# Patient Record
Sex: MALE | Race: WHITE | Employment: FULL TIME | URBAN - METROPOLITAN AREA
[De-identification: names, ages, dates, MRNs, and addresses within clinical notes are randomized per-mention and may not be internally consistent; named-entity substitution may affect disease eponyms.]

---

## 2023-07-11 ENCOUNTER — HOSPITAL ENCOUNTER (EMERGENCY)
Facility: HOSPITAL | Age: 48
Discharge: HOME | End: 2023-07-11
Attending: EMERGENCY MEDICINE
Payer: COMMERCIAL

## 2023-07-11 VITALS
BODY MASS INDEX: 22.03 KG/M2 | TEMPERATURE: 98 F | SYSTOLIC BLOOD PRESSURE: 136 MMHG | HEART RATE: 77 BPM | HEIGHT: 73.23 IN | RESPIRATION RATE: 18 BRPM | WEIGHT: 168 LBS | DIASTOLIC BLOOD PRESSURE: 84 MMHG | OXYGEN SATURATION: 100 %

## 2023-07-11 DIAGNOSIS — S81.812A LEG LACERATION, LEFT, INITIAL ENCOUNTER: Primary | ICD-10-CM

## 2023-07-11 PROCEDURE — 99283 EMERGENCY DEPT VISIT LOW MDM: CPT

## 2023-07-11 PROCEDURE — 90471 IMMUNIZATION ADMIN: CPT

## 2023-07-11 PROCEDURE — 12001 RPR S/N/AX/GEN/TRNK 2.5CM/<: CPT

## 2023-07-11 RX ORDER — LIDOCAINE HYDROCHLORIDE 10 MG/ML
20 INJECTION, SOLUTION EPIDURAL; INFILTRATION; INTRACAUDAL; PERINEURAL ONCE
Status: COMPLETED | OUTPATIENT
Start: 2023-07-11 | End: 2023-07-11

## 2023-07-11 RX ORDER — LIDOCAINE HYDROCHLORIDE 10 MG/ML
INJECTION, SOLUTION EPIDURAL; INFILTRATION; INTRACAUDAL; PERINEURAL
Status: COMPLETED
Start: 2023-07-11 | End: 2023-07-11

## 2023-07-12 NOTE — ED INITIAL ASSESSMENT (HPI)
Vice fell on patients left shin area; bleeding controlled. Wrapped currently in triage; cms intact; pulses intact. Pt ambulated with steady gait.  Tet shot unsure;

## 2023-07-12 NOTE — DISCHARGE INSTRUCTIONS
Keep clean. Follow-up with your primary physician for suture removal in 10 to 14 days. Return to the emergency department if redness, increasing pain, discharge, or other new symptoms develop.

## 2025-06-10 ENCOUNTER — ANESTHESIA EVENT (OUTPATIENT)
Dept: SURGERY | Facility: HOSPITAL | Age: 50
End: 2025-06-10
Payer: COMMERCIAL

## 2025-06-10 ENCOUNTER — HOSPITAL ENCOUNTER (OUTPATIENT)
Facility: HOSPITAL | Age: 50
Setting detail: OBSERVATION
Discharge: HOME | End: 2025-06-11
Attending: EMERGENCY MEDICINE | Admitting: HOSPITALIST
Payer: COMMERCIAL

## 2025-06-10 ENCOUNTER — APPOINTMENT (OUTPATIENT)
Dept: ULTRASOUND IMAGING | Facility: HOSPITAL | Age: 50
End: 2025-06-10
Attending: EMERGENCY MEDICINE
Payer: COMMERCIAL

## 2025-06-10 ENCOUNTER — ANESTHESIA (OUTPATIENT)
Dept: SURGERY | Facility: HOSPITAL | Age: 50
End: 2025-06-10
Payer: COMMERCIAL

## 2025-06-10 DIAGNOSIS — K81.0 ACUTE CHOLECYSTITIS: Primary | ICD-10-CM

## 2025-06-10 DIAGNOSIS — K81.9 CHOLECYSTITIS: ICD-10-CM

## 2025-06-10 LAB
ALBUMIN SERPL-MCNC: 4.8 G/DL (ref 3.2–4.8)
ALP LIVER SERPL-CCNC: 66 U/L (ref 45–117)
ALT SERPL-CCNC: 29 U/L (ref 10–49)
ANION GAP SERPL CALC-SCNC: 10 MMOL/L (ref 0–18)
AST SERPL-CCNC: 23 U/L (ref ?–34)
BASOPHILS # BLD AUTO: 0.04 X10(3) UL (ref 0–0.2)
BASOPHILS NFR BLD AUTO: 0.4 %
BILIRUB DIRECT SERPL-MCNC: 0.4 MG/DL (ref ?–0.3)
BILIRUB SERPL-MCNC: 1.5 MG/DL (ref 0.3–1.2)
BUN BLD-MCNC: 12 MG/DL (ref 9–23)
BUN/CREAT SERPL: 10.8 (ref 10–20)
CALCIUM BLD-MCNC: 10 MG/DL (ref 8.7–10.4)
CHLORIDE SERPL-SCNC: 103 MMOL/L (ref 98–112)
CO2 SERPL-SCNC: 27 MMOL/L (ref 21–32)
CREAT BLD-MCNC: 1.11 MG/DL (ref 0.7–1.3)
DEPRECATED RDW RBC AUTO: 41.1 FL (ref 35.1–46.3)
EGFRCR SERPLBLD CKD-EPI 2021: 80 ML/MIN/1.73M2 (ref 60–?)
EOSINOPHIL # BLD AUTO: 0.03 X10(3) UL (ref 0–0.7)
EOSINOPHIL NFR BLD AUTO: 0.3 %
ERYTHROCYTE [DISTWIDTH] IN BLOOD BY AUTOMATED COUNT: 12.6 % (ref 11–15)
GLUCOSE BLD-MCNC: 122 MG/DL (ref 70–99)
HCT VFR BLD AUTO: 41.4 % (ref 39–53)
HGB BLD-MCNC: 14.1 G/DL (ref 13–17.5)
IMM GRANULOCYTES # BLD AUTO: 0.03 X10(3) UL (ref 0–1)
IMM GRANULOCYTES NFR BLD: 0.3 %
LIPASE SERPL-CCNC: 33 U/L (ref 12–53)
LYMPHOCYTES # BLD AUTO: 1.28 X10(3) UL (ref 1–4)
LYMPHOCYTES NFR BLD AUTO: 11.5 %
MCH RBC QN AUTO: 30.3 PG (ref 26–34)
MCHC RBC AUTO-ENTMCNC: 34.1 G/DL (ref 31–37)
MCV RBC AUTO: 89 FL (ref 80–100)
MONOCYTES # BLD AUTO: 1.23 X10(3) UL (ref 0.1–1)
MONOCYTES NFR BLD AUTO: 11.1 %
NEUTROPHILS # BLD AUTO: 8.51 X10 (3) UL (ref 1.5–7.7)
NEUTROPHILS # BLD AUTO: 8.51 X10(3) UL (ref 1.5–7.7)
NEUTROPHILS NFR BLD AUTO: 76.4 %
OSMOLALITY SERPL CALC.SUM OF ELEC: 291 MOSM/KG (ref 275–295)
PLATELET # BLD AUTO: 191 10(3)UL (ref 150–450)
POTASSIUM SERPL-SCNC: 4.8 MMOL/L (ref 3.5–5.1)
PROT SERPL-MCNC: 7.3 G/DL (ref 5.7–8.2)
RBC # BLD AUTO: 4.65 X10(6)UL (ref 4.3–5.7)
SODIUM SERPL-SCNC: 140 MMOL/L (ref 136–145)
WBC # BLD AUTO: 11.1 X10(3) UL (ref 4–11)

## 2025-06-10 PROCEDURE — 99232 SBSQ HOSP IP/OBS MODERATE 35: CPT | Performed by: STUDENT IN AN ORGANIZED HEALTH CARE EDUCATION/TRAINING PROGRAM

## 2025-06-10 PROCEDURE — 47562 LAPAROSCOPIC CHOLECYSTECTOMY: CPT | Performed by: STUDENT IN AN ORGANIZED HEALTH CARE EDUCATION/TRAINING PROGRAM

## 2025-06-10 PROCEDURE — 99222 1ST HOSP IP/OBS MODERATE 55: CPT | Performed by: HOSPITALIST

## 2025-06-10 PROCEDURE — 76705 ECHO EXAM OF ABDOMEN: CPT | Performed by: EMERGENCY MEDICINE

## 2025-06-10 RX ORDER — BUPIVACAINE HYDROCHLORIDE 5 MG/ML
INJECTION, SOLUTION EPIDURAL; INTRACAUDAL; PERINEURAL AS NEEDED
Status: DISCONTINUED | OUTPATIENT
Start: 2025-06-10 | End: 2025-06-10 | Stop reason: HOSPADM

## 2025-06-10 RX ORDER — ACETAMINOPHEN 500 MG
1000 TABLET ORAL EVERY 8 HOURS
Status: DISCONTINUED | OUTPATIENT
Start: 2025-06-10 | End: 2025-06-11

## 2025-06-10 RX ORDER — ONDANSETRON 2 MG/ML
INJECTION INTRAMUSCULAR; INTRAVENOUS AS NEEDED
Status: DISCONTINUED | OUTPATIENT
Start: 2025-06-10 | End: 2025-06-10 | Stop reason: SURG

## 2025-06-10 RX ORDER — SODIUM CHLORIDE 9 MG/ML
INJECTION, SOLUTION INTRAVENOUS CONTINUOUS
Status: DISCONTINUED | OUTPATIENT
Start: 2025-06-10 | End: 2025-06-11

## 2025-06-10 RX ORDER — SODIUM CHLORIDE, SODIUM LACTATE, POTASSIUM CHLORIDE, CALCIUM CHLORIDE 600; 310; 30; 20 MG/100ML; MG/100ML; MG/100ML; MG/100ML
INJECTION, SOLUTION INTRAVENOUS CONTINUOUS
Status: DISCONTINUED | OUTPATIENT
Start: 2025-06-10 | End: 2025-06-10 | Stop reason: HOSPADM

## 2025-06-10 RX ORDER — LIDOCAINE HYDROCHLORIDE 10 MG/ML
INJECTION, SOLUTION EPIDURAL; INFILTRATION; INTRACAUDAL; PERINEURAL AS NEEDED
Status: DISCONTINUED | OUTPATIENT
Start: 2025-06-10 | End: 2025-06-10 | Stop reason: SURG

## 2025-06-10 RX ORDER — OXYCODONE HYDROCHLORIDE 5 MG/1
5 TABLET ORAL EVERY 4 HOURS PRN
Status: DISCONTINUED | OUTPATIENT
Start: 2025-06-10 | End: 2025-06-11

## 2025-06-10 RX ORDER — HYDROMORPHONE HYDROCHLORIDE 1 MG/ML
0.5 INJECTION, SOLUTION INTRAMUSCULAR; INTRAVENOUS; SUBCUTANEOUS EVERY 4 HOURS PRN
Status: DISCONTINUED | OUTPATIENT
Start: 2025-06-10 | End: 2025-06-10

## 2025-06-10 RX ORDER — HYDROMORPHONE HYDROCHLORIDE 1 MG/ML
0.2 INJECTION, SOLUTION INTRAMUSCULAR; INTRAVENOUS; SUBCUTANEOUS EVERY 5 MIN PRN
Status: DISCONTINUED | OUTPATIENT
Start: 2025-06-10 | End: 2025-06-10 | Stop reason: HOSPADM

## 2025-06-10 RX ORDER — DEXAMETHASONE SODIUM PHOSPHATE 4 MG/ML
VIAL (ML) INJECTION AS NEEDED
Status: DISCONTINUED | OUTPATIENT
Start: 2025-06-10 | End: 2025-06-10 | Stop reason: SURG

## 2025-06-10 RX ORDER — ONDANSETRON 2 MG/ML
4 INJECTION INTRAMUSCULAR; INTRAVENOUS EVERY 6 HOURS PRN
Status: DISCONTINUED | OUTPATIENT
Start: 2025-06-10 | End: 2025-06-10 | Stop reason: HOSPADM

## 2025-06-10 RX ORDER — ONDANSETRON 2 MG/ML
4 INJECTION INTRAMUSCULAR; INTRAVENOUS EVERY 6 HOURS PRN
Status: DISCONTINUED | OUTPATIENT
Start: 2025-06-10 | End: 2025-06-11

## 2025-06-10 RX ORDER — ROCURONIUM BROMIDE 10 MG/ML
INJECTION, SOLUTION INTRAVENOUS AS NEEDED
Status: DISCONTINUED | OUTPATIENT
Start: 2025-06-10 | End: 2025-06-10 | Stop reason: SURG

## 2025-06-10 RX ORDER — METRONIDAZOLE 500 MG/100ML
500 INJECTION, SOLUTION INTRAVENOUS EVERY 12 HOURS
Status: DISCONTINUED | OUTPATIENT
Start: 2025-06-11 | End: 2025-06-10

## 2025-06-10 RX ORDER — GLYCOPYRROLATE 0.2 MG/ML
INJECTION, SOLUTION INTRAMUSCULAR; INTRAVENOUS AS NEEDED
Status: DISCONTINUED | OUTPATIENT
Start: 2025-06-10 | End: 2025-06-10 | Stop reason: SURG

## 2025-06-10 RX ORDER — ESMOLOL HYDROCHLORIDE 10 MG/ML
INJECTION INTRAVENOUS AS NEEDED
Status: DISCONTINUED | OUTPATIENT
Start: 2025-06-10 | End: 2025-06-10 | Stop reason: SURG

## 2025-06-10 RX ORDER — KETOROLAC TROMETHAMINE 15 MG/ML
15 INJECTION, SOLUTION INTRAMUSCULAR; INTRAVENOUS ONCE
Status: COMPLETED | OUTPATIENT
Start: 2025-06-10 | End: 2025-06-10

## 2025-06-10 RX ORDER — MORPHINE SULFATE 4 MG/ML
4 INJECTION, SOLUTION INTRAMUSCULAR; INTRAVENOUS EVERY 2 HOUR PRN
Status: DISCONTINUED | OUTPATIENT
Start: 2025-06-10 | End: 2025-06-11

## 2025-06-10 RX ORDER — HYDROMORPHONE HYDROCHLORIDE 1 MG/ML
0.6 INJECTION, SOLUTION INTRAMUSCULAR; INTRAVENOUS; SUBCUTANEOUS EVERY 5 MIN PRN
Status: DISCONTINUED | OUTPATIENT
Start: 2025-06-10 | End: 2025-06-10 | Stop reason: HOSPADM

## 2025-06-10 RX ORDER — MORPHINE SULFATE 2 MG/ML
1 INJECTION, SOLUTION INTRAMUSCULAR; INTRAVENOUS EVERY 2 HOUR PRN
Status: DISCONTINUED | OUTPATIENT
Start: 2025-06-10 | End: 2025-06-11

## 2025-06-10 RX ORDER — METRONIDAZOLE 500 MG/100ML
500 INJECTION, SOLUTION INTRAVENOUS ONCE
Status: COMPLETED | OUTPATIENT
Start: 2025-06-10 | End: 2025-06-10

## 2025-06-10 RX ORDER — PROCHLORPERAZINE EDISYLATE 5 MG/ML
5 INJECTION INTRAMUSCULAR; INTRAVENOUS EVERY 8 HOURS PRN
Status: DISCONTINUED | OUTPATIENT
Start: 2025-06-10 | End: 2025-06-11

## 2025-06-10 RX ORDER — SODIUM CHLORIDE, SODIUM LACTATE, POTASSIUM CHLORIDE, CALCIUM CHLORIDE 600; 310; 30; 20 MG/100ML; MG/100ML; MG/100ML; MG/100ML
INJECTION, SOLUTION INTRAVENOUS CONTINUOUS PRN
Status: DISCONTINUED | OUTPATIENT
Start: 2025-06-10 | End: 2025-06-10 | Stop reason: SURG

## 2025-06-10 RX ORDER — TEMAZEPAM 15 MG/1
15 CAPSULE ORAL NIGHTLY PRN
Status: DISCONTINUED | OUTPATIENT
Start: 2025-06-10 | End: 2025-06-11

## 2025-06-10 RX ORDER — SODIUM CHLORIDE 9 MG/ML
125 INJECTION, SOLUTION INTRAVENOUS CONTINUOUS
Status: DISCONTINUED | OUTPATIENT
Start: 2025-06-10 | End: 2025-06-10

## 2025-06-10 RX ORDER — KETOROLAC TROMETHAMINE 30 MG/ML
INJECTION, SOLUTION INTRAMUSCULAR; INTRAVENOUS AS NEEDED
Status: DISCONTINUED | OUTPATIENT
Start: 2025-06-10 | End: 2025-06-10 | Stop reason: SURG

## 2025-06-10 RX ORDER — MORPHINE SULFATE 2 MG/ML
2 INJECTION, SOLUTION INTRAMUSCULAR; INTRAVENOUS EVERY 2 HOUR PRN
Status: DISCONTINUED | OUTPATIENT
Start: 2025-06-10 | End: 2025-06-11

## 2025-06-10 RX ORDER — ACETAMINOPHEN 500 MG
500 TABLET ORAL EVERY 4 HOURS PRN
Status: DISCONTINUED | OUTPATIENT
Start: 2025-06-10 | End: 2025-06-11

## 2025-06-10 RX ORDER — NALOXONE HYDROCHLORIDE 0.4 MG/ML
0.08 INJECTION, SOLUTION INTRAMUSCULAR; INTRAVENOUS; SUBCUTANEOUS AS NEEDED
Status: DISCONTINUED | OUTPATIENT
Start: 2025-06-10 | End: 2025-06-10 | Stop reason: HOSPADM

## 2025-06-10 RX ORDER — PROCHLORPERAZINE EDISYLATE 5 MG/ML
5 INJECTION INTRAMUSCULAR; INTRAVENOUS EVERY 8 HOURS PRN
Status: DISCONTINUED | OUTPATIENT
Start: 2025-06-10 | End: 2025-06-10 | Stop reason: HOSPADM

## 2025-06-10 RX ORDER — HYDROMORPHONE HYDROCHLORIDE 1 MG/ML
0.4 INJECTION, SOLUTION INTRAMUSCULAR; INTRAVENOUS; SUBCUTANEOUS EVERY 5 MIN PRN
Status: DISCONTINUED | OUTPATIENT
Start: 2025-06-10 | End: 2025-06-10 | Stop reason: HOSPADM

## 2025-06-10 RX ADMIN — ROCURONIUM BROMIDE 50 MG: 10 INJECTION, SOLUTION INTRAVENOUS at 16:58:00

## 2025-06-10 RX ADMIN — ESMOLOL HYDROCHLORIDE 30 MG: 10 INJECTION INTRAVENOUS at 16:58:00

## 2025-06-10 RX ADMIN — SODIUM CHLORIDE, SODIUM LACTATE, POTASSIUM CHLORIDE, CALCIUM CHLORIDE: 600; 310; 30; 20 INJECTION, SOLUTION INTRAVENOUS at 16:56:00

## 2025-06-10 RX ADMIN — SODIUM CHLORIDE, SODIUM LACTATE, POTASSIUM CHLORIDE, CALCIUM CHLORIDE: 600; 310; 30; 20 INJECTION, SOLUTION INTRAVENOUS at 18:12:00

## 2025-06-10 RX ADMIN — KETOROLAC TROMETHAMINE 30 MG: 30 INJECTION, SOLUTION INTRAMUSCULAR; INTRAVENOUS at 18:05:00

## 2025-06-10 RX ADMIN — DEXAMETHASONE SODIUM PHOSPHATE 4 MG: 4 MG/ML VIAL (ML) INJECTION at 17:05:00

## 2025-06-10 RX ADMIN — GLYCOPYRROLATE 0.2 MG: 0.2 INJECTION, SOLUTION INTRAMUSCULAR; INTRAVENOUS at 17:05:00

## 2025-06-10 RX ADMIN — ONDANSETRON 4 MG: 2 INJECTION INTRAMUSCULAR; INTRAVENOUS at 17:05:00

## 2025-06-10 RX ADMIN — LIDOCAINE HYDROCHLORIDE 50 MG: 10 INJECTION, SOLUTION EPIDURAL; INFILTRATION; INTRACAUDAL; PERINEURAL at 16:56:00

## 2025-06-10 NOTE — CONSULTS
[facility]  Report of Consultation    Sotero Buchanan Patient Status:  Emergency    3/25/1975 MRN [id number]   Location [facility] EMERGENCY DEPARTMENT Attending John Paul Santos MD   Hosp Day # 0 PCP PHYSICIAN NONSTAFF     Reason for Consultation:  Acute cholecystitis    History of Present Illness:  Sotero Buchanan is a 50 year old male who presents to [facility] on 6/10/2025 with complaints of upper abdominal pain for the past two days. Pain is constant and associated with nausea and vomiting. He has had similar, more mild pain in the past. He had a glass of water to drink earlier this afternoon, otherwise nothing to eat. Denies fever and chills. Bedside ultrasound revealed cholelithiasis with a gallstone in the gallbladder neck with wall thickening. WBC 11.1, afebrile, other labs pending.      Past medical history none    Past abdominal surgical history none    History:  Past Medical History[1]  Past Surgical History[2]  Family History[3]   reports that he has never smoked. He has never used smokeless tobacco.    Allergies:  Allergies[4]    Medications:  Prescriptions Prior to Admission[5]    Current Hospital Medications[6]    Review of Systems:  Review of Systems   Constitutional:  Negative for chills, diaphoresis and fever.   Respiratory:  Negative for shortness of breath.    Cardiovascular:  Negative for chest pain.   Gastrointestinal:  Positive for abdominal pain. Negative for nausea, vomiting, diarrhea, constipation and abdominal distention.   Neurological:  Negative for weakness.       Physical Exam:  /80   Pulse 108   Temp 98 °F (36.7 °C)   Resp 17   Wt 185 lb (83.9 kg)   SpO2 98%   BMI 24.26 kg/m²   Physical Exam  Vitals reviewed.   Constitutional:       General: He is not in acute distress.     Appearance: Normal appearance.   HENT:      Head: Normocephalic and atraumatic.      Right Ear: External ear normal.      Left Ear: External ear normal.       Nose: Nose normal.   Pulmonary:      Effort: Pulmonary effort is normal. No respiratory distress.   Abdominal:      General: There is no distension.      Palpations: Abdomen is soft.      Tenderness: There is abdominal tenderness (RUQ). There is no guarding or rebound.   Neurological:      Mental Status: He is alert and oriented to person, place, and time.   Psychiatric:         Mood and Affect: Mood normal.         Behavior: Behavior normal.         Thought Content: Thought content normal.         Judgment: Judgment normal.         Laboratory Data:  Recent Labs   Lab 06/10/25  1516   RBC 4.65   HGB 14.1   HCT 41.4   MCV 89.0   MCH 30.3   MCHC 34.1   RDW 12.6   NEPRELIM 8.51*   WBC 11.1*   .0       No results for input(s): \"GLU\", \"BUN\", \"CREATSERUM\", \"GFRAA\", \"GFRNAA\", \"CA\", \"ALB\", \"NA\", \"K\", \"CL\", \"CO2\", \"ALKPHO\", \"AST\", \"ALT\", \"BILT\", \"TP\" in the last 168 hours.      No results for input(s): \"PTP\", \"INR\", \"PTT\" in the last 168 hours.      No results found.    Medical Decision Making         Impression:  Problem List[7]    Cholecystitis with cholelithiasis    Plan:  Plan laparoscopic cholecystectomy with Dr. Reich this afternoon  Keep NPO prior to procedure  Continue IVF and IV abx  Analgesics and antiemetics as needed  Dr. Reich to see patient and provide additional recommendations as needed. Discharge home pending recovery from surgery.    Kike Zuleta PA-C  6/10/2025  3:53 PM                           [1] History reviewed. No pertinent past medical history.  [2] History reviewed. No pertinent surgical history.  [3] No family history on file.  [4] No Known Allergies  [5] (Not in a hospital admission)  [6]   Current Facility-Administered Medications:     sodium chloride 0.9% infusion, 125 mL/hr, Intravenous, Continuous    metroNIDAZOLE in sodium chloride 0.79% (Flagyl) 5 mg/mL IVPB premix 500 mg, 500 mg, Intravenous, Once    cefTRIAXone (Rocephin) 2 g in sodium chloride 0.9% 100mL IVPB-LENOARDA, 2 g, Intravenous,  Once  [7]   Patient Active Problem List  Diagnosis    Cholecystitis

## 2025-06-10 NOTE — ANESTHESIA PROCEDURE NOTES
Airway  Date/Time: 6/10/2025 5:00 PM  Reason: elective    Airway not difficult    General Information and Staff   Patient location during procedure: OR  Anesthesiologist: Peterson Amaya MD  Performed: anesthesiologist   Performed by: Peterson Amaya MD  Authorized by: Peterson Amaya MD        Indications and Patient Condition  Indications for airway management: anesthesia  Sedation level: deep      Preoxygenated: yesPatient position: sniffing  MILS not maintained throughout    Mask difficulty assessment: 1 - vent by mask    Final Airway Details    Final airway type: endotracheal airway    Successful airway: ETT  Cuffed: yes   Successful intubation technique: Video laryngoscopy  Blade size: #4  ETT size (mm): 7.5    Cormack-Lehane Classification: grade I - full view of glottis  Cuff volume (mL): 5  Measured from: lips  Number of attempts at approach: 1

## 2025-06-10 NOTE — ANESTHESIA PREPROCEDURE EVALUATION
Anesthesia PreOp Note    HPI:     Sotero Buchanan is a 50 year old male who presents for preoperative consultation requested by: Rohini Reich MD    Date of Surgery: 6/10/2025    Procedure(s):  LAPAROSCOPIC CHOLECYSTECTOMY  Indication: Cholecystitis [K81.9]    Relevant Problems   No relevant active problems       NPO:                         History Review:  Patient Active Problem List    Diagnosis Date Noted    Cholecystitis 06/10/2025    Acute cholecystitis 06/10/2025       Past Medical History[1]    Past Surgical History[2]    Prescriptions Prior to Admission[3]  Current Medications and Prescriptions Ordered in Epic[4]    Allergies[5]    Family History[6]  Social Hx on file[7]    Available pre-op labs reviewed.  Lab Results   Component Value Date    WBC 11.1 (H) 06/10/2025    RBC 4.65 06/10/2025    HGB 14.1 06/10/2025    HCT 41.4 06/10/2025    MCV 89.0 06/10/2025    MCH 30.3 06/10/2025    MCHC 34.1 06/10/2025    RDW 12.6 06/10/2025    .0 06/10/2025     Lab Results   Component Value Date     06/10/2025    K 4.8 06/10/2025     06/10/2025    CO2 27.0 06/10/2025    BUN 12 06/10/2025    CREATSERUM 1.11 06/10/2025     (H) 06/10/2025    CA 10.0 06/10/2025          Vital Signs:  Body mass index is 24.26 kg/m².   weight is 83.9 kg (185 lb). His temperature is 97.8 °F (36.6 °C). His blood pressure is 144/60 and his pulse is 89. His respiration is 16 and oxygen saturation is 97%.   Vitals:    06/10/25 1409 06/10/25 1518 06/10/25 1618 06/10/25 1633   BP: 148/81 146/80 144/78 144/60   Pulse: 110 108 101 89   Resp: 18 17 18 16   Temp: 98 °F (36.7 °C)   97.8 °F (36.6 °C)   SpO2: 98% 98% 98% 97%   Weight: 83.9 kg (185 lb)           Anesthesia Evaluation     Patient summary reviewed and Nursing notes reviewed    No history of anesthetic complications   Airway   Mallampati: II  TM distance: >3 FB  Neck ROM: full  Dental - Dentition appears grossly intact     Pulmonary - normal exam   Cardiovascular    Exercise tolerance: good    Rhythm: regular    Neuro/Psych      GI/Hepatic/Renal    (-) GERD    Endo/Other    Abdominal                  Anesthesia Plan:   ASA:  1  Plan:   General  Airway:  ETT  Plan Comments: Eliud,  N/v yesterday, feeling fine today  GA OETT  Informed Consent Plan and Risks Discussed With:  Patient      I have informed Sotero Buchanan and/or legal guardian or family member of the nature of the anesthetic plan, benefits, risks including possible dental damage if relevant, major complications, and any alternative forms of anesthetic management.   All of the patient's questions were answered to the best of my ability. The patient desires the anesthetic management as planned.  Peterson Amaya MD  6/10/2025 4:48 PM  Present on Admission:  **None**           [1] History reviewed. No pertinent past medical history.  [2] History reviewed. No pertinent surgical history.  [3]   No medications prior to admission.   [4]   Current Facility-Administered Medications Ordered in Epic   Medication Dose Route Frequency Provider Last Rate Last Admin    sodium chloride 0.9% infusion  125 mL/hr Intravenous Continuous John Paul Santos  mL/hr at 06/10/25 1536 125 mL/hr at 06/10/25 1536    metroNIDAZOLE in sodium chloride 0.79% (Flagyl) 5 mg/mL IVPB premix 500 mg  500 mg Intravenous Once John Paul Satnos  mL/hr at 06/10/25 1607 500 mg at 06/10/25 1607     No current [facility]-ordered outpatient medications on file.   [5] No Known Allergies  [6] No family history on file.  [7]   Social History  Socioeconomic History    Marital status:    Tobacco Use    Smoking status: Never    Smokeless tobacco: Never

## 2025-06-10 NOTE — DISCHARGE INSTRUCTIONS
POST-OPERATIVE INSTRUCTIONS LAPAROSCOPIC/ROBOTIC SURGERY    Thank you very much for allowing me to participate in your care, it is truly a privilege. Below please see discharge orders that will help you during your recovery. Please do not hesitate to call the office at [phone] for any questions. After hours, the same number will allow you to reach the on-call surgeon.     Call the office [phone] to schedule your 2 week post-operative appointment with Dr. Reich or her Physician Assistant (PA).    Change bandages daily or more frequently if needed.  Keep incisions clean with soap/ water daily.   Cover incision(s) as needed.  If you have skin glue this will come off on its own    May place an ice pack over your incisions on and off (15min) at a time for the next 24-48 hours.    Resume regular diet as tolerated (recommend starting with liquids)    General guidelines for activity:      Avoid strenuous activity or lifting anything heavier than 15 pounds.    It is OK to be up and walking around. Going up and down stairs is also OK.    Do what is comfortable: stop and rest when you feel tired.    It is OK to shower after 24 hours    You will have pain medicine ordered. Take as directed/needed.    Some discomfort, mild bruising, and swelling are not unusual; please call my office if you have any severe pain, bleeding, or high fever (over 101°F)    During the robotic/laparoscopic procedure that you had, gas is pumped into the abdominal cavity.  You may feel abdominal, shoulder, or rib pain for a few days due to this.    Resume home medications (see medication reconciliation sheet)       Do NOT drive for one day and while taking your narcotic pain medicine.        Watch for signs of infection:    Excessive warmth or bright redness around your incisions    Leakage of bloody or cloudy fluid from you incisions    Fever over 100.5    If you experience constipation  Increase your water intake.  Increase your activity;  walking is best.  An over the counter stool softener or mild laxative may be necessary if you still have not had a bowel movement after several days.       Please call the office at [phone] for any questions and to make your post op appointment if needed. Thank you again for allowing me to participate in your care, and get well soon!      Rohini Reich MD  [facility] - General Surgery   [address] [place] [address]  [place]  p [phone]

## 2025-06-10 NOTE — ED PROVIDER NOTES
Patient Seen in: [facility] Post Anesthesia Care Unit        History  Chief Complaint   Patient presents with    Abdominal Pain     Stated Complaint: abdominal pain    Subjective:   HPI                  Objective:     History reviewed. No pertinent past medical history.           History reviewed. No pertinent surgical history.             Social History     Socioeconomic History    Marital status:    Tobacco Use    Smoking status: Never    Smokeless tobacco: Never                                Physical Exam    ED Triage Vitals   BP 06/10/25 1409 148/81   Pulse 06/10/25 1409 110   Resp 06/10/25 1409 18   Temp 06/10/25 1409 98 °F (36.7 °C)   Temp src 06/10/25 1811 Temporal   SpO2 06/10/25 1409 98 %   O2 Device 06/10/25 1518 None (Room air)       Current Vitals:   Vital Signs  BP: 152/82  Pulse: 103  Resp: 13  Temp: 99 °F (37.2 °C)  Temp src: Temporal  MAP (mmHg): 100    Oxygen Therapy  SpO2: 97 %  O2 Device: Nasal cannula  O2 Flow Rate (L/min): 2 L/min  Pulse Oximetry Type: Intermittent            Physical Exam            ED Course  Labs Reviewed   BASIC METABOLIC PANEL (8) - Abnormal; Notable for the following components:       Result Value    Glucose 122 (*)     All other components within normal limits   HEPATIC FUNCTION PANEL (7) - Abnormal; Notable for the following components:    Bilirubin, Total 1.5 (*)     Bilirubin, Direct 0.4 (*)     All other components within normal limits   CBC WITH DIFFERENTIAL WITH PLATELET - Abnormal; Notable for the following components:    WBC 11.1 (*)     Neutrophil Absolute Prelim 8.51 (*)     Neutrophil Absolute 8.51 (*)     Monocyte Absolute 1.23 (*)     All other components within normal limits   LIPASE - Normal   SURGICAL PATHOLOGY TISSUE                            MDM     50-year-old male with a history of gallstones presents today with abdominal pain.  Patient states that he has had 4 episodes of epigastric/right upper quadrant abdominal pain in the last 2  years.  He had gallstones diagnosed incidentally on x-ray from a chiropractor.  Patient states around 2 PM yesterday he started having some epigastric and right upper quadrant pain which has continued through presentation.  Denies fevers, nausea/vomiting, diarrhea, dysuria, shortness of breath, or other associated symptoms.    On exam, slightly tachycardic with otherwise normal vitals, well-appearing and nontoxic, symptoms to palpation in the epigastrium and right upper quadrant with a positive Ortiz sign.  No CVA tenderness.  No abdominal distention.    Differential: Biliary colic versus cholecystitis    I performed and interpreted at bedside gallbladder ultrasound which showed multiple large gallstones 1 of which was in the gallbladder neck.  Gallbladder wall thickening up to 0.75 cm without pericholecystic fluid.  No CBD dilation.    Labs and radiology ultrasound were ordered.  Labs show mild total bilirubin elevation and mild leukocytosis.  Radiology ultrasound redemonstrates the above findings.    Patient ordered for analgesia and antibiotics.    General surgery was consulted and plans to take to the OR this afternoon.    Admission disposition: 6/10/2025  4:21 PM           MDM    Disposition and Plan     Clinical Impression:  1. Acute cholecystitis    2. Cholecystitis         Disposition:  Admit  6/10/2025  4:21 pm    Follow-up:  [facility]  [address] [facility] [address]  [facility] [address]  [phone]    Follow up in 2 week(s)  appt with Dr. Damir DE LA O for lap nithya 6/10 post op          Medications Prescribed:  There are no discharge medications for this patient.            Supplementary Documentation:         Hospital Problems       Present on Admission           ICD-10-CM Noted POA    * (Principal) Acute cholecystitis K81.0 6/10/2025 Unknown    Cholecystitis K81.9 6/10/2025 Unknown

## 2025-06-10 NOTE — ANESTHESIA POSTPROCEDURE EVALUATION
Patient: Sotero Buchanan    Procedure Summary       Date: 06/10/25 Room / Location: [facility] MAIN OR 09 / [facility] MAIN OR    Anesthesia Start: 1656 Anesthesia Stop:     Procedure: LAPAROSCOPIC CHOLECYSTECTOMY (Abdomen) Diagnosis:       Cholecystitis      (Cholecystitis [K81.9])    Surgeons: Rohini Reich MD Anesthesiologist: Brendan Huggins MD    Anesthesia Type: general ASA Status: 1            Anesthesia Type: No value filed.    Vitals Value Taken Time   /74 06/10/25 18:11   Temp 97.6 °F (36.4 °C) 06/10/25 18:11   Pulse 88 06/10/25 18:11   Resp 14 06/10/25 18:11   SpO2 98 % 06/10/25 18:11       EM AN Post Evaluation:   Patient Evaluated in PACU  Patient Participation: complete - patient participated  Level of Consciousness: awake  Pain Management: adequate  Airway Patency:patent  Dental exam unchanged from preop  Yes    Cardiovascular Status: acceptable  Respiratory Status: acceptable  Postoperative Hydration acceptable      Brendan Huggins MD  6/10/2025 6:11 PM

## 2025-06-10 NOTE — OPERATIVE REPORT
OPERATIVE NOTE    PATIENT NAME: Sotero Buchanan    :  3/25/1975    MRN:  [id number]  ATTENDING PHYSICIAN:  Rohini Reich MD    PROCEDURE DATE:  6/10/2025       PREOPERATIVE DIAGNOSIS: Acute calculous cholecystitis     POSTOPERATIVE DIAGNOSIS: same plus chronic cholecystitis     SURGEON: Rohini Reich MD    ASSISTANT: Kike Zuleta PA-C     OPERATION: Laparoscopic cholecystectomy    ANESTHESIA: General    ESTIMATED BLOOD LOSS:  5 ml      COMPLICATIONS: none     SPECIMENS: Was Obtained: Gallbladder    INDICATIONS: The patient is a 50 year old year old male with history of above preop diagnosis.  I explained the risk, benefits, expected outcome, and alternatives to the procedure. Patient understands the risks include but not inclusive to bleeding, infection, bile leak, bile duct injury, diarrhea, chronic pain, and persistent symptoms.  He indicates understanding and wishes to proceed with surgery.     DESCRIPTION OF PROCEDURE:    The patient was brought to the operating room and placed in supine position.  After initiation of general anesthesia by the Anesthesia Department, the abdomen was prepped and draped normal sterile fashion.  A Veress needle was placed in the left upper quadrant and the abdomen was insufflated without difficulty to a pressure of 15 mmhg.  We placed three 5 mm trocars, one in the left upper quadrant, 2 in the right upper quadrant, and 11 mm trocar in the supraumbilical position.    Upon entering the abdomen, the gallbladder was identified, grasped, and retracted cephalad. The gallbladder was severely inflamed and distended. It was decompressed with a laparoscopic needle. The peritoneum overlying the junction of the cystic duct was stripped free.  Hook electrocautery was used to detach the lateral peritoneal attachments and the left and right side of the gallbladder up towards the fundus.  We isolated the cystic duct and cystic artery and dissected the posterior aspect of the gallbladder from the  gallbladder fossa.  There were only 2 structures noted, the cystic duct and the cystic artery.  The critical view of safety was obtained. We placed 2 Hemolock clips on the stay side of the cystic duct and 1 clip on the gallbladder side and the cystic duct was divided.  We placed 1 clips on the stay side of the cystic artery, 1 clip on the gallbladder side, and the cystic artery was divided.  Hook electrocautery was used to remove the gallbladder from the gallbladder fossa.  Prior to amputating the gallbladder, we again re-evaluated our clip structures, there was no evidence of bleeding or bilious extravasation.  The gallbladder was amputated and removed using a wound protection device.  Multiple 0 Vicryl sutures were used to close the fascial defect at the gallbladder extraction site. Local anesthetic was injected into the TAP plane along each trocar site and the skin incision were also anesthetized. The skin was closed using 4-0 Monocryl suture.  All instrument and sponge counts were correct at the end of the case. I was present and scrubbed for the entire operation. The patient tolerated the procedure well, was extubated, and sent to the recovery room in stable condition.           Rohini Reich MD  [facility] - General Surgery   [address] [place] [address]  p [phone]

## 2025-06-11 VITALS
BODY MASS INDEX: 24.39 KG/M2 | HEART RATE: 88 BPM | DIASTOLIC BLOOD PRESSURE: 80 MMHG | TEMPERATURE: 99 F | SYSTOLIC BLOOD PRESSURE: 137 MMHG | HEIGHT: 73 IN | WEIGHT: 184 LBS | OXYGEN SATURATION: 93 % | RESPIRATION RATE: 18 BRPM

## 2025-06-11 LAB
ALBUMIN SERPL-MCNC: 4.2 G/DL (ref 3.2–4.8)
ALBUMIN/GLOB SERPL: 2.1 {RATIO} (ref 1–2)
ALP LIVER SERPL-CCNC: 55 U/L (ref 45–117)
ALT SERPL-CCNC: 64 U/L (ref 10–49)
ANION GAP SERPL CALC-SCNC: 9 MMOL/L (ref 0–18)
AST SERPL-CCNC: 50 U/L (ref ?–34)
BASOPHILS # BLD AUTO: 0.02 X10(3) UL (ref 0–0.2)
BASOPHILS NFR BLD AUTO: 0.2 %
BILIRUB DIRECT SERPL-MCNC: 0.4 MG/DL (ref ?–0.3)
BILIRUB SERPL-MCNC: 1.2 MG/DL (ref 0.3–1.2)
BUN BLD-MCNC: 13 MG/DL (ref 9–23)
BUN/CREAT SERPL: 12.5 (ref 10–20)
CALCIUM BLD-MCNC: 9 MG/DL (ref 8.7–10.4)
CHLORIDE SERPL-SCNC: 103 MMOL/L (ref 98–112)
CO2 SERPL-SCNC: 27 MMOL/L (ref 21–32)
CREAT BLD-MCNC: 1.04 MG/DL (ref 0.7–1.3)
DEPRECATED RDW RBC AUTO: 42 FL (ref 35.1–46.3)
EGFRCR SERPLBLD CKD-EPI 2021: 86 ML/MIN/1.73M2 (ref 60–?)
EOSINOPHIL # BLD AUTO: 0 X10(3) UL (ref 0–0.7)
EOSINOPHIL NFR BLD AUTO: 0 %
ERYTHROCYTE [DISTWIDTH] IN BLOOD BY AUTOMATED COUNT: 12.8 % (ref 11–15)
GLOBULIN PLAS-MCNC: 2 G/DL (ref 2–3.5)
GLUCOSE BLD-MCNC: 116 MG/DL (ref 70–99)
HCT VFR BLD AUTO: 36.3 % (ref 39–53)
HGB BLD-MCNC: 12.2 G/DL (ref 13–17.5)
IMM GRANULOCYTES # BLD AUTO: 0.04 X10(3) UL (ref 0–1)
IMM GRANULOCYTES NFR BLD: 0.4 %
LYMPHOCYTES # BLD AUTO: 0.93 X10(3) UL (ref 1–4)
LYMPHOCYTES NFR BLD AUTO: 10.3 %
MCH RBC QN AUTO: 30.2 PG (ref 26–34)
MCHC RBC AUTO-ENTMCNC: 33.6 G/DL (ref 31–37)
MCV RBC AUTO: 89.9 FL (ref 80–100)
MONOCYTES # BLD AUTO: 0.81 X10(3) UL (ref 0.1–1)
MONOCYTES NFR BLD AUTO: 9 %
NEUTROPHILS # BLD AUTO: 7.24 X10 (3) UL (ref 1.5–7.7)
NEUTROPHILS # BLD AUTO: 7.24 X10(3) UL (ref 1.5–7.7)
NEUTROPHILS NFR BLD AUTO: 80.1 %
OSMOLALITY SERPL CALC.SUM OF ELEC: 289 MOSM/KG (ref 275–295)
PLATELET # BLD AUTO: 163 10(3)UL (ref 150–450)
POTASSIUM SERPL-SCNC: 4.3 MMOL/L (ref 3.5–5.1)
PROT SERPL-MCNC: 6.2 G/DL (ref 5.7–8.2)
RBC # BLD AUTO: 4.04 X10(6)UL (ref 4.3–5.7)
SODIUM SERPL-SCNC: 139 MMOL/L (ref 136–145)
WBC # BLD AUTO: 9 X10(3) UL (ref 4–11)

## 2025-06-11 PROCEDURE — 99239 HOSP IP/OBS DSCHRG MGMT >30: CPT | Performed by: HOSPITALIST

## 2025-06-11 RX ORDER — IBUPROFEN 600 MG/1
600 TABLET, FILM COATED ORAL EVERY 8 HOURS PRN
Qty: 15 TABLET | Refills: 0 | Status: SHIPPED | OUTPATIENT
Start: 2025-06-11 | End: 2025-06-23 | Stop reason: ALTCHOICE

## 2025-06-11 RX ORDER — OXYCODONE HYDROCHLORIDE 5 MG/1
5 TABLET ORAL EVERY 6 HOURS PRN
Qty: 10 TABLET | Refills: 0 | Status: SHIPPED | OUTPATIENT
Start: 2025-06-11 | End: 2025-06-23 | Stop reason: ALTCHOICE

## 2025-06-11 NOTE — PLAN OF CARE
Pt a/o x 4, vss. Pt c/o mild pain to abd, ambulating in room, +flatus, -n/v. Discussed POC, discharge instructions and follow up care. IV removed and pt discharged home with family.   Problem: Patient Centered Care  Goal: Patient preferences are identified and integrated in the patient's plan of care  Description: Interventions:  - What would you like us to know as we care for you?   - Provide timely, complete, and accurate information to patient/family  - Incorporate patient and family knowledge, values, beliefs, and cultural backgrounds into the planning and delivery of care  - Encourage patient/family to participate in care and decision-making at the level they choose  - Honor patient and family perspectives and choices  Outcome: Adequate for Discharge     Problem: Patient/Family Goals  Goal: Patient/Family Long Term Goal  Description: Patient's Long Term Goal:     Interventions:  -   - See additional Care Plan goals for specific interventions  Outcome: Adequate for Discharge  Goal: Patient/Family Short Term Goal  Description: Patient's Short Term Goal:     Interventions:   -   - See additional Care Plan goals for specific interventions  Outcome: Adequate for Discharge     Problem: PAIN - ADULT  Goal: Verbalizes/displays adequate comfort level or patient's stated pain goal  Description: INTERVENTIONS:  - Encourage pt to monitor pain and request assistance  - Assess pain using appropriate pain scale  - Administer analgesics based on type and severity of pain and evaluate response  - Implement non-pharmacological measures as appropriate and evaluate response  - Consider cultural and social influences on pain and pain management  - Manage/alleviate anxiety  - Utilize distraction and/or relaxation techniques  - Monitor for opioid side effects  - Notify MD/LIP if interventions unsuccessful or patient reports new pain  - Anticipate increased pain with activity and pre-medicate as appropriate  Outcome: Adequate for  Discharge     Problem: DISCHARGE PLANNING  Goal: Discharge to home or other facility with appropriate resources  Description: INTERVENTIONS:  - Identify barriers to discharge w/pt and caregiver  - Include patient/family/discharge partner in discharge planning  - Arrange for needed discharge resources and transportation as appropriate  - Identify discharge learning needs (meds, wound care, etc)  - Arrange for interpreters to assist at discharge as needed  - Consider post-discharge preferences of patient/family/discharge partner  - Complete POLST form as appropriate  - Assess patient's ability to be responsible for managing their own health  - Refer to Case Management Department for coordinating discharge planning if the patient needs post-hospital services based on physician/LIP order or complex needs related to functional status, cognitive ability or social support system  Outcome: Adequate for Discharge     Problem: SKIN/TISSUE INTEGRITY - ADULT  Goal: Incision(s), wounds(s) or drain site(s) healing without S/S of infection  Description: INTERVENTIONS:  - Assess and document risk factors for pressure ulcer development  - Assess and document skin integrity  - Assess and document dressing/incision, wound bed, drain sites and surrounding tissue  - Implement wound care per orders  - Initiate isolation precautions as appropriate  - Initiate Pressure Ulcer prevention bundle as indicated  Outcome: Adequate for Discharge

## 2025-06-11 NOTE — H&P
[facility]    PATIENT'S NAME: SHRUTHI ELIZABETH   ATTENDING PHYSICIAN: John Paul Santos MD   PATIENT ACCOUNT#:   [id number]    LOCATION:  [address]  MEDICAL RECORD #:   [id number]       YOB: 1975  ADMISSION DATE:       06/10/2025    HISTORY AND PHYSICAL EXAMINATION    CHIEF COMPLAINT:  Acute cholecystitis.    HISTORY OF PRESENT ILLNESS:  The patient is a 50-year-old  male who came in to the emergency department for evaluation of right upper quadrant abdominal pain associated with nausea and vomiting since yesterday 2 p.m. after eating salami sandwich.  Bedside ultrasound done by emergency room physician showed cholelithiasis and 1 stone lodged in the gallbladder neck, no common bile duct dilation.  CBC, chemistry, liver function tests, lipase, and formal gallbladder ultrasound still pending.  Started on IV Rocephin and Flagyl, and he will be admitted to the hospital for further management.    PAST MEDICAL HISTORY:  None.    PAST SURGICAL HISTORY:  None.    ALLERGIES:  No known drug allergies.    FAMILY HISTORY:  Multiple family members with history of cholecystectomy.    SOCIAL HISTORY:  Occasional alcohol.  No tobacco or drug use.    REVIEW OF SYSTEMS:  The patient said in the last several weeks to month he has been having multiple episodes of right upper quadrant abdominal pain associated with nausea especially after eating high-fat diet.  Yesterday after eating, started developing mid epigastric and right upper quadrant abdominal pain radiation to the back associated with nausea and vomiting.  No fever or chills.  Other 12-point review of systems negative.      PHYSICAL EXAMINATION:    GENERAL:  Alert, oriented to time, place, and person, mild distress.   VITAL SIGNS:  Temperature 98.0.  Pulse 108.  Respiratory rate 17.  Blood pressure 146/80.  Pulse ox 98% on room air.  HEENT:  Atraumatic.  Oropharynx clear.  Moist mucous membranes.   Ears, nose normal.  Eyes:  Anicteric  sclerae.  NECK:  Supple.  No lymphadenopathy.  Trachea midline.  Full range of motion.  LUNGS:  Clear to auscultation bilaterally.  Normal respiratory effort.  HEART:  Regular rate and rhythm.  S1 and S2 auscultated.  No murmur.  ABDOMEN:  Soft, nondistended.  Tenderness noted right upper quadrant area.  No guarding or rebound tenderness.  EXTREMITIES:  No peripheral edema, clubbing, or cyanosis.  NEUROLOGIC:  Motor and sensory intact.    ASSESSMENT:  Acute cholecystitis with cholelithiasis.  No evidence of common bile duct dilation on bedside ultrasound.    PLAN:  The patient will be admitted to general medical floor.  IV fluids.  Pain and nausea control.  N.p.o.  IV antibiotics, Rocephin and Flagyl.  General Surgery consult to evaluate the patient for laparoscopic cholecystectomy.  Sequential compression devices for DVT prophylaxis.      Dictated By Marga Rico MD  d: 06/10/2025 15:44:45  t: 06/10/2025 16:02:49  Job [id number]  FB/    cc: John Paul Santos MD

## 2025-06-11 NOTE — PLAN OF CARE
Patient transferred to unit from PACU. Alert and oriented. Room air. Vital signs stable. Denies nausea. Pain managed with scheduled tylenol and prn IVP morphine. Surgical incisions clean/dry/intact. IVF continued. Call light and personal belongings within reach. Safety precautions in place.     Problem: Patient Centered Care  Goal: Patient preferences are identified and integrated in the patient's plan of care  Description: Interventions:  - Provide timely, complete, and accurate information to patient/family  - Incorporate patient and family knowledge, values, beliefs, and cultural backgrounds into the planning and delivery of care  - Encourage patient/family to participate in care and decision-making at the level they choose  - Honor patient and family perspectives and choices  Outcome: Progressing    Problem: PAIN - ADULT  Goal: Verbalizes/displays adequate comfort level or patient's stated pain goal  Description: INTERVENTIONS:  - Encourage pt to monitor pain and request assistance  - Assess pain using appropriate pain scale  - Administer analgesics based on type and severity of pain and evaluate response  - Implement non-pharmacological measures as appropriate and evaluate response  - Consider cultural and social influences on pain and pain management  - Manage/alleviate anxiety  - Utilize distraction and/or relaxation techniques  - Monitor for opioid side effects  - Notify MD/LIP if interventions unsuccessful or patient reports new pain  - Anticipate increased pain with activity and pre-medicate as appropriate  Outcome: Progressing     Problem: DISCHARGE PLANNING  Goal: Discharge to home or other facility with appropriate resources  Description: INTERVENTIONS:  - Identify barriers to discharge w/pt and caregiver  - Include patient/family/discharge partner in discharge planning  - Arrange for needed discharge resources and transportation as appropriate  - Identify discharge learning needs (meds, wound care,  etc)  - Arrange for interpreters to assist at discharge as needed  - Consider post-discharge preferences of patient/family/discharge partner  - Complete POLST form as appropriate  - Assess patient's ability to be responsible for managing their own health  - Refer to Case Management Department for coordinating discharge planning if the patient needs post-hospital services based on physician/LIP order or complex needs related to functional status, cognitive ability or social support system  Outcome: Progressing     Problem: SKIN/TISSUE INTEGRITY - ADULT  Goal: Incision(s), wounds(s) or drain site(s) healing without S/S of infection  Description: INTERVENTIONS:  - Assess and document risk factors for pressure ulcer development  - Assess and document skin integrity  - Assess and document dressing/incision, wound bed, drain sites and surrounding tissue  - Implement wound care per orders  - Initiate isolation precautions as appropriate  - Initiate Pressure Ulcer prevention bundle as indicated  Outcome: Progressing

## 2025-06-11 NOTE — PROGRESS NOTES
[facility]  Progress Note    Sotero Buchanan Patient Status:  Inpatient    3/25/1975 MRN [id number]   Location [facility] 4W/SW/SE Attending Dawood Tabor MD   Hosp Day # 1 PCP PHYSICIAN NONSTAFF     Subjective:  Patient resting comfortably in bed this morning.  Reports postoperative abdominal soreness, improved with analgesics.  Tolerating diet without nausea or emesis.  Reports little passed flatus, denies bowel movement.    Objective/Physical Exam:  General: Alert, orientated x3.  Cooperative.  No apparent distress.  Vital Signs:  Blood pressure 137/80, pulse 88, temperature 98.9 °F (37.2 °C), temperature source Oral, resp. rate 18, height 6' 1\" (1.854 m), weight 184 lb (83.5 kg), SpO2 93%.  Wt Readings from Last 3 Encounters:   06/10/25 184 lb (83.5 kg)   23 168 lb (76.2 kg)     Lungs: No respiratory distress.  Cardiac: Regular rate and rhythm.   Abdomen:  Soft, non distended, expected incisional tenderness, with no rebound or guarding.  No peritoneal signs.   Extremities:  No lower extremity edema noted.    Incision: Clean, dry, intact.  Mild ecchymosis near umbilical incision.  Incisions with Dermabond.    Intake/Output:    Intake/Output Summary (Last 24 hours) at 2025 0843  Last data filed at 2025 0645  Gross per 24 hour   Intake 1805 ml   Output 5 ml   Net 1800 ml     I/O last 3 completed shifts:  In: 1805 [I.V.:1705; IV PIGGYBACK:100]  Out: 5 [Blood:5]  No intake/output data recorded.    Medications: Scheduled Medications[1]    Labs:  Lab Results   Component Value Date    WBC 9.0 2025    HGB 12.2 2025    HCT 36.3 2025    .0 2025     Lab Results   Component Value Date     2025    K 4.3 2025     2025    CO2 27.0 2025    BUN 13 2025    CREATSERUM 1.04 2025     2025    CA 9.0 2025    ALKPHO 55 2025    ALT 64 2025    AST 50 2025    BILT 1.2 2025     ALB 4.2 06/11/2025    TP 6.2 06/11/2025     No results found for: \"PT\", \"INR\"      Assessment  Problem List[2]    POD 1 s/p laparoscopic cholecystectomy on 6/10/2025    Plan:  Patient is stable for discharge home from surgical standpoint as pain is controlled and tolerating diet  Follow-up with general surgery in 2 weeks  Analgesics and antiemetics as needed  Continue regular diet as tolerated  Ambulate and up to chair    Quality:  DVT Mechanical Prophylaxis:   SCDs,    DVT Pharmacologic Prophylaxis   Medication   None                Code Status: Not on file  Quiñones: No urinary catheter in place  Quiñones Duration (in days):   Central line:    SHAGUFTA: 6/11/2025        Leatha Frankel PA-C  6/11/2025  8:43 AM               [1]    acetaminophen  1,000 mg Oral Q8H   [2]   Patient Active Problem List  Diagnosis    Cholecystitis    Acute cholecystitis

## 2025-06-11 NOTE — DISCHARGE SUMMARY
[facility]  part of [facility]     DISCHARGE SUMMARY     Sotero Buchanan Patient Status:  Inpatient    3/25/1975 MRN [id number]   Location [facility] 4W/SW/SE Attending Dawood Tabor MD   Hosp Day # 1 PCP PHYSICIAN NONSTAFF     DATE OF ADMISSION: 6/10/2025  DATE OF DISCHARGE:  25  DISPOSITION: home  CONDITION ON DISCHARGE: good    DISCHARGE DIAGNOSES:    Acute cholecystitis    OPERATION: Laparoscopic cholecystectomy     HISTORY OF PRESENT ILLNESS (COPIED FROM ADMISSION H&P)  The patient is a 50-year-old  male who came in to the emergency department for evaluation of right upper quadrant abdominal pain associated with nausea and vomiting since yesterday 2 p.m. after eating salami sandwich. Bedside ultrasound done by emergency room physician showed cholelithiasis and 1 stone lodged in the gallbladder neck, no common bile duct dilation. CBC, chemistry, liver function tests, lipase, and formal gallbladder ultrasound still pending. Started on IV Rocephin and Flagyl, and he will be admitted to the hospital for further management.     HOSPITAL COURSE:  Patient was admitted, had lap nithya as described above.  Did well postoperatively.  Cleared by general surgery for discharge.    Patient understands return to the emergency room for increased pain, fever, discharge, shortness of breath, chest pain, new neurologic symptoms, other concerning symptoms.    PHYSICAL EXAM:  Temp:  [97.8 °F (36.6 °C)-99.2 °F (37.3 °C)] 98.9 °F (37.2 °C)  Pulse:  [] 88  Resp:  [13-22] 18  BP: (137-164)/(60-89) 137/80  SpO2:  [93 %-98 %] 93 %  Gen: A+Ox3.  No distress.   HEENT: NCAT, neck supple, no carotid bruit.  CV: RRR, S1S2, and intact distal pulses. No gallop, rub, murmur.  Pulm: Effort and breath sounds normal. No distress, wheezes, rales, rhonchi.  Abd: Soft, appropriately tender incisions.  Incisions clean dry and intact  Neuro: Normal reflexes, CN. Sensory/motor exams grossly normal  deficit. Coordination  and gait normal.   MS: No joint effusions.  No peripheral edema.  Skin: Skin is warm and dry. No rashes, erythema, diaphoresis.   Psych: Normal mood and affect. Behavior and judgment normal.     DISCHARGE MEDICATIONS     Discharge Medications        START taking these medications        Instructions Prescription details   ibuprofen 600 MG Tabs  Commonly known as: Motrin      Take 1 tablet (600 mg total) by mouth every 8 (eight) hours as needed for Pain.   Quantity: 15 tablet  Refills: 0     oxyCODONE 5 MG Tabs      Take 1 tablet (5 mg total) by mouth every 6 (six) hours as needed for Pain.   Quantity: 10 tablet  Refills: 0               Where to Get Your Medications        [facility] [address] [place] [address] [phone], [phone]  [address] [address]      Phone: [phone]   ibuprofen 600 MG Tabs  oxyCODONE 5 MG Tabs         CONSULTANTS  Consultants         Provider   Role Specialty     Rohini Reich MD      Consulting Physician SURGERY, GENERAL            FOLLOW UP:  Replaced by [facility]  [address] [facility] [address]  Health system [id number] [phone]    Follow up in 2 week(s)  appt with Dr. Reich or PA for lap nithya 6/10 post op    Nonstaff, Physician    Follow up in 1 week(s)  Post Discharge Followup    The above plan and follow-up instructions were reviewed with the patient and they verbalized understanding and agreement.  They understand to return to the emergency room for any concerning signs or symptoms.  Greater than 30 minutes spent on discharge.  -----------------------    Hospital Discharge Diagnoses: Cholecystitis    Lace+ Score: 26  59-90 High Risk  29-58 Medium Risk  0-28   Low Risk.    TCM Follow-Up Recommendation:  LACE < 29: Low Risk of readmission after discharge from the hospital. No TCM follow-up needed.  Supplementary Documentation:

## 2025-06-23 ENCOUNTER — OFFICE VISIT (OUTPATIENT)
Dept: SURGERY | Facility: CLINIC | Age: 50
End: 2025-06-23
Payer: COMMERCIAL

## 2025-06-23 VITALS — DIASTOLIC BLOOD PRESSURE: 79 MMHG | SYSTOLIC BLOOD PRESSURE: 116 MMHG | HEART RATE: 93 BPM

## 2025-06-23 DIAGNOSIS — Z48.89 ENCOUNTER FOR POSTOPERATIVE CARE: Primary | ICD-10-CM

## 2025-06-23 PROCEDURE — 3074F SYST BP LT 130 MM HG: CPT

## 2025-06-23 PROCEDURE — 99024 POSTOP FOLLOW-UP VISIT: CPT

## 2025-06-23 PROCEDURE — 3078F DIAST BP <80 MM HG: CPT

## 2025-06-23 NOTE — PROGRESS NOTES
Follow Up Visit Note       Active Problems      1. Encounter for postoperative care          Chief Complaint   Chief Complaint   Patient presents with    Post-Op     Pt is here for post op lap nithya he had on 6/12         History of Present Illness  Sotero Buchanan is a pleasant 50 year old year old patient presenting for post op appointment. He is s/p laparoscopic cholecystectomy with Dr. Reich on 6/12/2025. He generally feels well, he denies abdominal pain. Reports occasional abdominal discomfort. He is tolerating a general diet without diarrhea or constipation. He denies fever, chills, SOB, chest pain, leg swelling or calf tenderness. Patient declines any complaints at this time.       Allergies  Sotero has no known allergies.    Past Medical / Surgical / Social / Family History    The past medical and past surgical history have been reviewed by me today.    Past Medical History[1]  Past Surgical History[2]    The family history and social history have been reviewed by me today.    Family History[3]  Social Hx on file[4]   Medications - Current[5]     Review of Systems  The Review of Systems has been reviewed by me during today.  Review of Systems   Constitutional:  Negative for appetite change, chills, fatigue and fever.   Gastrointestinal:  Negative for abdominal distention, abdominal pain, constipation, diarrhea, nausea and vomiting.        Physical Findings   /79 (BP Location: Left arm, Patient Position: Sitting, Cuff Size: adult)   Pulse 93   Physical Exam  Constitutional:       Appearance: Normal appearance. He is normal weight.   Cardiovascular:      Rate and Rhythm: Normal rate and regular rhythm.      Pulses: Normal pulses.   Pulmonary:      Effort: Pulmonary effort is normal.      Breath sounds: Normal breath sounds.   Abdominal:      General: Abdomen is flat. A surgical scar is present. Bowel sounds are normal.      Palpations: Abdomen is soft.      Tenderness: There is no abdominal tenderness.       Comments: Surgical scars are healing well. C/D/I.    Skin:     General: Skin is warm and dry.      Capillary Refill: Capillary refill takes less than 2 seconds.   Neurological:      Mental Status: He is alert.   Psychiatric:         Mood and Affect: Mood normal.         Behavior: Behavior normal.         Thought Content: Thought content normal.          Assessment   1. Encounter for postoperative care      Pathology reviewed with the patient    Plan   No further lifting restrictions  Continue good hygiene of incision site  OK to swim or take a bath  Continue small non-fatty, soft, bland meals and adequate hydration  Follow up as needed       No orders of the defined types were placed in this encounter.      Imaging & Referrals   None    Follow Up  No follow-ups on file.    Leatha Frankel PA-C     Patient seen and examined by myself. 100% clinical time and 100% MDM was performed by me.          [1] History reviewed. No pertinent past medical history.  [2]   Past Surgical History:  Procedure Laterality Date    Laparoscopic cholecystectomy N/A 06/10/2025   [3] No family history on file.  [4]   Social History  Socioeconomic History    Marital status:    Tobacco Use    Smoking status: Never    Smokeless tobacco: Never   [5]   Current Outpatient Medications:     oxyCODONE 5 MG Oral Tab, Take 1 tablet (5 mg total) by mouth every 6 (six) hours as needed for Pain., Disp: 10 tablet, Rfl: 0    ibuprofen 600 MG Oral Tab, Take 1 tablet (600 mg total) by mouth every 8 (eight) hours as needed for Pain., Disp: 15 tablet, Rfl: 0

## (undated) DEVICE — TROCAR: Brand: KII® SLEEVE

## (undated) DEVICE — GLOVE SUR 6 SENSICARE PI MIC PIP CRM PWD F

## (undated) DEVICE — PLUMEPORT ACTIV LAPAROSCOPIC SMOKE FILTRATION DEVICE: Brand: PLUMEPORT ACTIVE

## (undated) DEVICE — DRAIN SUR W10MMXL20CM SIL FULL PERF HUBLESS

## (undated) DEVICE — SUT COAT VCRL + 0 54IN ABSRB UD ANTIBACT

## (undated) DEVICE — STRIP PK 0.25INX5YD TIGHTLY WVN IODO CURAD

## (undated) DEVICE — GLOVE SUR 6.5 SENSICARE PI MIC PIP CRM PWD F

## (undated) DEVICE — [HIGH FLOW INSUFFLATOR,  DO NOT USE IF PACKAGE IS DAMAGED,  KEEP DRY,  KEEP AWAY FROM SUNLIGHT,  PROTECT FROM HEAT AND RADIOACTIVE SOURCES.]: Brand: PNEUMOSURE

## (undated) DEVICE — SUT MCRYL 4-0 18IN PS-2 ABSRB UD 19MM 3/8 CIR

## (undated) DEVICE — Device: Brand: SUTURE PASSOR PRO

## (undated) DEVICE — TISSUE RETRIEVAL SYSTEM: Brand: INZII RETRIEVAL SYSTEM

## (undated) DEVICE — EXOFIN PRECISION PEN HIGH VISCOSITY TOPICAL SKIN ADHESIVE: Brand: EXOFIN PRECISION PEN, 1G

## (undated) DEVICE — SOLUTION IRRIG 3000ML 0.9% NACL FLX CONT

## (undated) DEVICE — TROCAR: Brand: KII FIOS FIRST ENTRY

## (undated) DEVICE — LAP CHOLE: Brand: MEDLINE INDUSTRIES, INC.

## (undated) DEVICE — GAMMEX® PI HYBRID SIZE 6.5, STERILE POWDER-FREE SURGICAL GLOVE, POLYISOPRENE AND NEOPRENE BLEND: Brand: GAMMEX

## (undated) DEVICE — TUBING MEGADYNE LAPAROSCOPIC

## (undated) DEVICE — SOLUTION IRRIG 1000ML 0.9% NACL USP BTL

## (undated) DEVICE — DAVINCI XI CLIP HEMO O LOK MEDIUM-LARGE GREEN

## (undated) DEVICE — INSUFFLATION NEEDLE TO ESTABLISH PNEUMOPERITONEUM.: Brand: INSUFFLATION NEEDLE

## (undated) DEVICE — EVACUATOR SUR 100CC SIL BLB WND

## (undated) NOTE — LETTER
To Whom It May Concern:    Sotero Buchanan has been under our care regarding ongoing medical issues. Because of this, he has been required to restrict his physical activities. He was hospitalized from 6/10/25-6/11/25.     He may resume his usual activities, including work only when cleared by a physician to do so.      Please feel free to contact us if there are any questions.      Sincerely,    JERRY CAO MD    [facility] HOSPITALIST  [address] [facility] [address]  [phone]        Document generated by:  JERRY CAO MD